# Patient Record
Sex: FEMALE | Race: WHITE | Employment: STUDENT | URBAN - METROPOLITAN AREA
[De-identification: names, ages, dates, MRNs, and addresses within clinical notes are randomized per-mention and may not be internally consistent; named-entity substitution may affect disease eponyms.]

---

## 2020-07-16 ENCOUNTER — OFFICE VISIT (OUTPATIENT)
Dept: FAMILY MEDICINE CLINIC | Facility: CLINIC | Age: 25
End: 2020-07-16
Payer: COMMERCIAL

## 2020-07-16 VITALS
HEIGHT: 65 IN | DIASTOLIC BLOOD PRESSURE: 66 MMHG | BODY MASS INDEX: 17.99 KG/M2 | HEART RATE: 54 BPM | SYSTOLIC BLOOD PRESSURE: 107 MMHG | TEMPERATURE: 97 F | RESPIRATION RATE: 16 BRPM | WEIGHT: 108 LBS

## 2020-07-16 DIAGNOSIS — Z00.00 ROUTINE GENERAL MEDICAL EXAMINATION AT A HEALTH CARE FACILITY: Primary | ICD-10-CM

## 2020-07-16 PROCEDURE — 99385 PREV VISIT NEW AGE 18-39: CPT | Performed by: FAMILY MEDICINE

## 2020-07-16 PROCEDURE — 3008F BODY MASS INDEX DOCD: CPT | Performed by: FAMILY MEDICINE

## 2020-07-16 PROCEDURE — 3074F SYST BP LT 130 MM HG: CPT | Performed by: FAMILY MEDICINE

## 2020-07-16 PROCEDURE — 3078F DIAST BP <80 MM HG: CPT | Performed by: FAMILY MEDICINE

## 2020-07-16 NOTE — PROGRESS NOTES
HPI:    Patient ID: Courtney Oswald is a 21year old female. HPI  Patient presents with:  Routine Physical  Starting college in [place]    Review of Systems   Constitutional: Negative. HENT: Negative. Eyes: Negative. Respiratory: Negative.     Car place, and time. She has normal strength and normal reflexes. No sensory deficit. Skin:   No suspicious lesions above the waist exam   Psychiatric: She has a normal mood and affect. Her mood appears not anxious. She does not exhibit a depressed mood.    V

## 2020-07-17 ENCOUNTER — PATIENT MESSAGE (OUTPATIENT)
Dept: FAMILY MEDICINE CLINIC | Facility: CLINIC | Age: 25
End: 2020-07-17

## 2020-07-17 NOTE — TELEPHONE ENCOUNTER
From: Matheus Bernstein  To: Radha Odell DO  Sent: 7/17/2020 9:26 AM CDT  Subject: Other    Hi Dr. Gianni Fuller,    I am contacting you regarding my college’s physical examination form that we briefly discussed during my visit.  I am hoping you might be able to

## 2020-07-17 NOTE — TELEPHONE ENCOUNTER
Please respond to pool: EM [facility] LPN/CMA  Please see patient's email and advise. --form is in Media

## 2020-07-23 ENCOUNTER — TELEPHONE (OUTPATIENT)
Dept: FAMILY MEDICINE CLINIC | Facility: CLINIC | Age: 25
End: 2020-07-23

## 2020-07-24 NOTE — TELEPHONE ENCOUNTER
Called spoke with pt on 7/23/2020, notified px is ready for , question was answered, copy sent to scan

## 2020-08-31 ENCOUNTER — MED REC SCAN ONLY (OUTPATIENT)
Dept: FAMILY MEDICINE CLINIC | Facility: CLINIC | Age: 25
End: 2020-08-31